# Patient Record
Sex: MALE | Race: WHITE | NOT HISPANIC OR LATINO | Employment: OTHER | URBAN - NONMETROPOLITAN AREA
[De-identification: names, ages, dates, MRNs, and addresses within clinical notes are randomized per-mention and may not be internally consistent; named-entity substitution may affect disease eponyms.]

---

## 2023-12-29 PROBLEM — I25.10 ARTERIOSCLEROSIS OF CORONARY ARTERY: Status: ACTIVE | Noted: 2023-12-29

## 2023-12-29 PROBLEM — C43.71: Status: ACTIVE | Noted: 2023-12-29

## 2023-12-29 PROBLEM — Z98.61 S/P PTCA (PERCUTANEOUS TRANSLUMINAL CORONARY ANGIOPLASTY): Status: ACTIVE | Noted: 2023-12-29

## 2023-12-29 PROBLEM — I21.3 STEMI (ST ELEVATION MYOCARDIAL INFARCTION) (MULTI): Status: ACTIVE | Noted: 2023-12-29

## 2023-12-29 PROBLEM — R04.0 BLEEDING FROM THE NOSE: Status: ACTIVE | Noted: 2023-12-29

## 2023-12-29 RX ORDER — MULTIVIT WITH MINERALS/HERBS
1 TABLET ORAL DAILY
COMMUNITY

## 2023-12-29 RX ORDER — PIOGLITAZONEHYDROCHLORIDE 30 MG/1
1 TABLET ORAL DAILY
COMMUNITY

## 2023-12-29 RX ORDER — NAPHAZOLINE HYDROCHLORIDE AND PHENIRAMINE MALEATE .25; 3 MG/ML; MG/ML
1 SOLUTION/ DROPS OPHTHALMIC 3 TIMES DAILY
COMMUNITY

## 2023-12-29 RX ORDER — ATORVASTATIN CALCIUM 80 MG/1
1 TABLET, FILM COATED ORAL NIGHTLY
COMMUNITY

## 2023-12-29 RX ORDER — CLOPIDOGREL BISULFATE 75 MG/1
1 TABLET ORAL DAILY
COMMUNITY
Start: 2022-11-29 | End: 2024-01-26

## 2023-12-29 RX ORDER — METFORMIN HYDROCHLORIDE 500 MG/1
500 TABLET ORAL
COMMUNITY

## 2023-12-29 RX ORDER — ASPIRIN 81 MG/1
1 TABLET ORAL DAILY
COMMUNITY

## 2023-12-29 RX ORDER — MULTIVIT-MIN/VIT C/HERB NO.124 250-8.875
TABLET,CHEWABLE ORAL
COMMUNITY
End: 2024-01-26

## 2023-12-29 RX ORDER — NITROGLYCERIN 0.4 MG/1
TABLET SUBLINGUAL
COMMUNITY
End: 2024-01-26 | Stop reason: SDUPTHER

## 2023-12-29 RX ORDER — SEMAGLUTIDE 1.34 MG/ML
1 INJECTION, SOLUTION SUBCUTANEOUS
COMMUNITY

## 2023-12-29 RX ORDER — DOXAZOSIN 8 MG/1
1 TABLET ORAL DAILY
COMMUNITY

## 2024-01-26 ENCOUNTER — OFFICE VISIT (OUTPATIENT)
Dept: CARDIOLOGY | Facility: CLINIC | Age: 79
End: 2024-01-26
Payer: MEDICARE

## 2024-01-26 VITALS
WEIGHT: 218 LBS | BODY MASS INDEX: 31.21 KG/M2 | HEIGHT: 70 IN | DIASTOLIC BLOOD PRESSURE: 70 MMHG | HEART RATE: 66 BPM | SYSTOLIC BLOOD PRESSURE: 128 MMHG

## 2024-01-26 DIAGNOSIS — R07.9 CHEST PAIN, UNSPECIFIED TYPE: ICD-10-CM

## 2024-01-26 DIAGNOSIS — E78.2 MIXED HYPERLIPIDEMIA: ICD-10-CM

## 2024-01-26 DIAGNOSIS — Z98.61 S/P PTCA (PERCUTANEOUS TRANSLUMINAL CORONARY ANGIOPLASTY): ICD-10-CM

## 2024-01-26 DIAGNOSIS — E11.9 DIABETES MELLITUS TYPE II, NON INSULIN DEPENDENT (MULTI): ICD-10-CM

## 2024-01-26 DIAGNOSIS — I25.10 ARTERIOSCLEROSIS OF CORONARY ARTERY: ICD-10-CM

## 2024-01-26 PROCEDURE — 1159F MED LIST DOCD IN RCRD: CPT | Performed by: INTERNAL MEDICINE

## 2024-01-26 PROCEDURE — 1160F RVW MEDS BY RX/DR IN RCRD: CPT | Performed by: INTERNAL MEDICINE

## 2024-01-26 PROCEDURE — 1126F AMNT PAIN NOTED NONE PRSNT: CPT | Performed by: INTERNAL MEDICINE

## 2024-01-26 PROCEDURE — 99213 OFFICE O/P EST LOW 20 MIN: CPT | Performed by: INTERNAL MEDICINE

## 2024-01-26 PROCEDURE — 1036F TOBACCO NON-USER: CPT | Performed by: INTERNAL MEDICINE

## 2024-01-26 RX ORDER — NITROGLYCERIN 0.4 MG/1
0.4 TABLET SUBLINGUAL EVERY 5 MIN PRN
Qty: 90 TABLET | Refills: 3 | Status: SHIPPED | OUTPATIENT
Start: 2024-01-26 | End: 2025-01-25

## 2024-01-26 NOTE — PROGRESS NOTES
"Subjective   Flash Cornejo is a 78 y.o. male       Chief Complaint    Follow-up          78-year-old gentleman returns for follow-up and is doing very well he has no cardiovascular complaints, events, hospitalizations, or nitrate usage.    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 He has underlying diabetes mellitus, low normal LV function, hyperlipidemia and obesity.     He continues exercising 3 times weekly, his NYHA classification is class I-II.    Recommendations: Obtain lipid panel, follow-up now in 1 year on same therapies and same activity level           Review of Systems   All other systems reviewed and are negative.         Visit Vitals  /70 (BP Location: Left arm, Patient Position: Sitting)   Pulse 66   Ht 1.778 m (5' 10\")   Wt 98.9 kg (218 lb)   BMI 31.28 kg/m²   Smoking Status Former   BSA 2.21 m²        Objective   Physical Exam  Constitutional:       Appearance: Normal appearance. He is normal weight.   HENT:      Nose: Nose normal.   Neck:      Vascular: No carotid bruit.   Cardiovascular:      Rate and Rhythm: Normal rate.      Pulses: Normal pulses.      Heart sounds: Normal heart sounds.   Pulmonary:      Effort: Pulmonary effort is normal.   Abdominal:      General: Bowel sounds are normal.      Palpations: Abdomen is soft.   Genitourinary:     Rectum: Normal.   Musculoskeletal:         General: Normal range of motion.      Cervical back: Normal range of motion.      Right lower leg: No edema.      Left lower leg: No edema.   Skin:     General: Skin is warm and dry.   Neurological:      General: No focal deficit present.      Mental Status: He is alert.   Psychiatric:         Mood and Affect: Mood normal.         Behavior: Behavior normal.         Thought Content: Thought content normal.         Judgment: Judgment normal.         Current Medications    Current Outpatient Medications:     aspirin 81 mg EC tablet, Take 1 tablet " (81 mg) by mouth once daily., Disp: , Rfl:     atorvastatin (Lipitor) 80 mg tablet, Take 1 tablet (80 mg) by mouth once daily at bedtime., Disp: , Rfl:     doxazosin (Cardura) 8 mg tablet, Take 1 tablet (8 mg) by mouth once daily., Disp: , Rfl:     empagliflozin (Jardiance) 25 mg, Take 1 tablet (25 mg) by mouth once daily., Disp: , Rfl:     metFORMIN (Glucophage) 500 mg tablet, Take 1 tablet (500 mg) by mouth 2 times a day with meals., Disp: , Rfl:     multivitamin with minerals (MULTIPLE VITAMIN-MINERALS ORAL), Take 1 tablet by mouth once daily., Disp: , Rfl:     naphazoline-pheniramine (Naphcon-A) 0.025-0.3 % ophthalmic solution, Administer 1 drop into both eyes 3 times a day., Disp: , Rfl:     nitroglycerin (Nitrostat) 0.4 mg SL tablet, Place under the tongue., Disp: , Rfl:     pioglitazone (Actos) 30 mg tablet, Take 1 tablet (30 mg) by mouth once daily., Disp: , Rfl:     semaglutide (Ozempic) 0.25 mg or 0.5 mg(2 mg/1.5 mL) pen injector, Inject 1 mg under the skin 1 (one) time per week., Disp: , Rfl:     vitamin B complex (Vitamins B Complex) tablet, Take 1 tablet by mouth once daily., Disp: , Rfl:                      Assessment/Plan   1. Arteriosclerosis of coronary artery        2. S/P PTCA (percutaneous transluminal coronary angioplasty)        3. Diabetes mellitus type II, non insulin dependent (CMS/[facility])        4. Mixed hyperlipidemia         Scribe Attestation  By signing my name below, IZoë LPN , Scribe   attest that this documentation has been prepared under the direction and in the presence of Arden Montano DO.

## 2025-01-28 ENCOUNTER — APPOINTMENT (OUTPATIENT)
Dept: CARDIOLOGY | Facility: CLINIC | Age: 80
End: 2025-01-28
Payer: MEDICARE

## 2025-02-25 ENCOUNTER — APPOINTMENT (OUTPATIENT)
Dept: CARDIOLOGY | Facility: CLINIC | Age: 80
End: 2025-02-25
Payer: MEDICARE

## 2025-02-25 VITALS
WEIGHT: 217 LBS | DIASTOLIC BLOOD PRESSURE: 78 MMHG | BODY MASS INDEX: 31.07 KG/M2 | HEIGHT: 70 IN | HEART RATE: 80 BPM | SYSTOLIC BLOOD PRESSURE: 120 MMHG

## 2025-02-25 DIAGNOSIS — Z87.891 FORMER SMOKER: ICD-10-CM

## 2025-02-25 DIAGNOSIS — I21.3 ST ELEVATION MYOCARDIAL INFARCTION (STEMI), UNSPECIFIED ARTERY (MULTI): ICD-10-CM

## 2025-02-25 DIAGNOSIS — E78.2 MIXED HYPERLIPIDEMIA: ICD-10-CM

## 2025-02-25 DIAGNOSIS — I25.10 ARTERIOSCLEROSIS OF CORONARY ARTERY: ICD-10-CM

## 2025-02-25 DIAGNOSIS — E11.9 DIABETES MELLITUS TYPE II, NON INSULIN DEPENDENT (MULTI): ICD-10-CM

## 2025-02-25 DIAGNOSIS — Z98.61 S/P PTCA (PERCUTANEOUS TRANSLUMINAL CORONARY ANGIOPLASTY): ICD-10-CM

## 2025-02-25 PROCEDURE — 1159F MED LIST DOCD IN RCRD: CPT | Performed by: INTERNAL MEDICINE

## 2025-02-25 PROCEDURE — 1036F TOBACCO NON-USER: CPT | Performed by: INTERNAL MEDICINE

## 2025-02-25 PROCEDURE — 1160F RVW MEDS BY RX/DR IN RCRD: CPT | Performed by: INTERNAL MEDICINE

## 2025-02-25 PROCEDURE — 99213 OFFICE O/P EST LOW 20 MIN: CPT | Performed by: INTERNAL MEDICINE

## 2025-02-25 RX ORDER — METFORMIN HYDROCHLORIDE 1000 MG/1
1000 TABLET ORAL
COMMUNITY

## 2025-02-25 RX ORDER — PIOGLITAZONEHYDROCHLORIDE 15 MG/1
15 TABLET ORAL DAILY
COMMUNITY

## 2025-02-25 NOTE — PROGRESS NOTES
"Subjective   Flash Cornejo is a 79 y.o. male       Chief Complaint    Annual Exam          79-year-old healthy gentleman returns for annual follow-up he is doing well he denies any cardiovascular events or complaints or nitrate usage or recurrent hospitalizations.  He is performing calisthenics 3 days a week and warmer months he is walking on a regular basis.    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 He has underlying diabetes mellitus, low normal LV function, hyperlipidemia and obesity.    Recommendations, continue current therapies, obtain lipid panel from primary care physician within this next upcoming year, continue active healthy lifestyle follow-up in 1 year         Review of Systems   All other systems reviewed and are negative.           Vitals:    02/25/25 1501   BP: 120/78   BP Location: Left arm   Patient Position: Sitting   Pulse: 80   Weight: 98.4 kg (217 lb)   Height: 1.778 m (5' 10\")        Objective   Physical Exam  Constitutional:       Appearance: Normal appearance.   HENT:      Nose: Nose normal.   Neck:      Vascular: No carotid bruit.   Cardiovascular:      Rate and Rhythm: Normal rate.      Pulses: Normal pulses.      Heart sounds: Normal heart sounds.   Pulmonary:      Effort: Pulmonary effort is normal.   Abdominal:      General: Bowel sounds are normal.      Palpations: Abdomen is soft.   Musculoskeletal:         General: Normal range of motion.      Cervical back: Normal range of motion.      Right lower leg: No edema.      Left lower leg: No edema.   Skin:     General: Skin is warm and dry.   Neurological:      General: No focal deficit present.      Mental Status: He is alert.   Psychiatric:         Mood and Affect: Mood normal.         Behavior: Behavior normal.         Thought Content: Thought " content normal.         Judgment: Judgment normal.         Allergies  Beta-blockers (beta-adrenergic blocking agts), Glyburide, and Liraglutide     Current Medications    Current Outpatient Medications:     aspirin 81 mg EC tablet, Take 1 tablet (81 mg) by mouth once daily., Disp: , Rfl:     atorvastatin (Lipitor) 80 mg tablet, Take 1 tablet (80 mg) by mouth once daily at bedtime., Disp: , Rfl:     doxazosin (Cardura) 8 mg tablet, Take 1 tablet (8 mg) by mouth once daily., Disp: , Rfl:     empagliflozin (Jardiance) 25 mg, Take 1 tablet (25 mg) by mouth once daily., Disp: , Rfl:     metFORMIN (Glucophage) 1,000 mg tablet, Take 1 tablet (1,000 mg) by mouth 2 times daily (morning and late afternoon)., Disp: , Rfl:     multivitamin with minerals (MULTIPLE VITAMIN-MINERALS ORAL), Take 1 tablet by mouth once daily., Disp: , Rfl:     naphazoline-pheniramine (Naphcon-A) 0.025-0.3 % ophthalmic solution, Administer 1 drop into both eyes 3 times a day., Disp: , Rfl:     nitroglycerin (Nitrostat) 0.4 mg SL tablet, Place 1 tablet (0.4 mg) under the tongue every 5 minutes if needed for chest pain., Disp: 90 tablet, Rfl: 3    pioglitazone (Actos) 15 mg tablet, Take 1 tablet (15 mg) by mouth once daily., Disp: , Rfl:     semaglutide (Ozempic) 0.25 mg or 0.5 mg(2 mg/1.5 mL) pen injector, Inject 1 mg under the skin 1 (one) time per week., Disp: , Rfl:     vitamin B complex (Vitamins B Complex) tablet, Take 1 tablet by mouth once daily., Disp: , Rfl:                      Assessment/Plan   1. Arteriosclerosis of coronary artery  Follow Up In Cardiology      2. S/P PTCA (percutaneous transluminal coronary angioplasty)  Follow Up In Cardiology      3. ST elevation myocardial infarction (STEMI), unspecified artery (Multi)        4. Diabetes mellitus type II, non insulin dependent (Multi)  Follow Up In Cardiology      5. Mixed hyperlipidemia  Follow Up In Cardiology      6. Former smoker        7. BMI 31.0-31.9,adult                 Scribe  Attestation  By signing my name below, I, Ottoniel Cruz LPNibmikael   attest that this documentation has been prepared under the direction and in the presence of Arden Montano DO.     Provider Attestation - Scribe documentation    All medical record entries made by the Scribe were at my direction and personally dictated by me. I have reviewed the chart and agree that the record accurately reflects my personal performance of the history, physical exam, discussion and plan.

## 2025-02-25 NOTE — PATIENT INSTRUCTIONS
Please bring all medicines, vitamins, and herbal supplements with you when you come to the office.    Prescriptions will not be filled unless you are compliant with your follow up appointments or have a follow up appointment scheduled as per instruction of your physician. Refills should be requested at the time of your visit.   BMI was above normal measurement. Current weight: 98.4 kg (217 lb)  Weight change since last visit (-) denotes wt loss -1 lbs   Weight loss needed to achieve BMI 25: 43.1 Lbs  Weight loss needed to achieve BMI 30: 8.4 Lbs  Provided instructions on dietary changes  Provided instructions on exercise.

## 2025-02-25 NOTE — LETTER
"February 25, 2025     Sandra Phipps DO  [address]  [place] [address]    Patient: Aaron Cornejo   YOB: 1945   Date of Visit: 2/25/2025       Dear Dr. Sandra Phipps, DO:    Thank you for referring Aaron Cornejo to me for evaluation. Below are my notes for this consultation.  If you have questions, please do not hesitate to call me. I look forward to following your patient along with you.       Sincerely,     Arden Montano DO      CC: No Recipients  ______________________________________________________________________________________    Subjective   Flash Cornejo is a 79 y.o. male       Chief Complaint    Annual Exam          79-year-old healthy gentleman returns for annual follow-up he is doing well he denies any cardiovascular events or complaints or nitrate usage or recurrent hospitalizations.  He is performing calisthenics 3 days a week and warmer months he is walking on a regular basis.    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 He has underlying diabetes mellitus, low normal LV function, hyperlipidemia and obesity.    Recommendations, continue current therapies, obtain lipid panel from primary care physician within this next upcoming year, continue active healthy lifestyle follow-up in 1 year         Review of Systems   All other systems reviewed and are negative.           Vitals:    02/25/25 1501   BP: 120/78   BP Location: Left arm   Patient Position: Sitting   Pulse: 80   Weight: 98.4 kg (217 lb)   Height: 1.778 m (5' 10\")        Objective   Physical Exam  Constitutional:       Appearance: Normal appearance.   HENT:      Nose: Nose normal.   Neck:      Vascular: No carotid bruit.   Cardiovascular:      Rate and Rhythm: Normal rate.      Pulses: Normal pulses. "      Heart sounds: Normal heart sounds.   Pulmonary:      Effort: Pulmonary effort is normal.   Abdominal:      General: Bowel sounds are normal.      Palpations: Abdomen is soft.   Musculoskeletal:         General: Normal range of motion.      Cervical back: Normal range of motion.      Right lower leg: No edema.      Left lower leg: No edema.   Skin:     General: Skin is warm and dry.   Neurological:      General: No focal deficit present.      Mental Status: He is alert.   Psychiatric:         Mood and Affect: Mood normal.         Behavior: Behavior normal.         Thought Content: Thought content normal.         Judgment: Judgment normal.         Allergies  Beta-blockers (beta-adrenergic blocking agts), Glyburide, and Liraglutide     Current Medications    Current Outpatient Medications:   •  aspirin 81 mg EC tablet, Take 1 tablet (81 mg) by mouth once daily., Disp: , Rfl:   •  atorvastatin (Lipitor) 80 mg tablet, Take 1 tablet (80 mg) by mouth once daily at bedtime., Disp: , Rfl:   •  doxazosin (Cardura) 8 mg tablet, Take 1 tablet (8 mg) by mouth once daily., Disp: , Rfl:   •  empagliflozin (Jardiance) 25 mg, Take 1 tablet (25 mg) by mouth once daily., Disp: , Rfl:   •  metFORMIN (Glucophage) 1,000 mg tablet, Take 1 tablet (1,000 mg) by mouth 2 times daily (morning and late afternoon)., Disp: , Rfl:   •  multivitamin with minerals (MULTIPLE VITAMIN-MINERALS ORAL), Take 1 tablet by mouth once daily., Disp: , Rfl:   •  naphazoline-pheniramine (Naphcon-A) 0.025-0.3 % ophthalmic solution, Administer 1 drop into both eyes 3 times a day., Disp: , Rfl:   •  nitroglycerin (Nitrostat) 0.4 mg SL tablet, Place 1 tablet (0.4 mg) under the tongue every 5 minutes if needed for chest pain., Disp: 90 tablet, Rfl: 3  •  pioglitazone (Actos) 15 mg tablet, Take 1 tablet (15 mg) by mouth once daily., Disp: , Rfl:   •  semaglutide (Ozempic) 0.25 mg or 0.5 mg(2 mg/1.5 mL) pen injector, Inject 1 mg under the skin 1 (one) time per  week., Disp: , Rfl:   •  vitamin B complex (Vitamins B Complex) tablet, Take 1 tablet by mouth once daily., Disp: , Rfl:                      Assessment/Plan   1. Arteriosclerosis of coronary artery  Follow Up In Cardiology      2. S/P PTCA (percutaneous transluminal coronary angioplasty)  Follow Up In Cardiology      3. ST elevation myocardial infarction (STEMI), unspecified artery (Multi)        4. Diabetes mellitus type II, non insulin dependent (Multi)  Follow Up In Cardiology      5. Mixed hyperlipidemia  Follow Up In Cardiology      6. Former smoker        7. BMI 31.0-31.9,adult                 Scribe Attestation  By signing my name below, I, Zoë FUENTES LPN  , Scribe   attest that this documentation has been prepared under the direction and in the presence of Arden Montano DO.     Provider Attestation - Scribe documentation    All medical record entries made by the Scribe were at my direction and personally dictated by me. I have reviewed the chart and agree that the record accurately reflects my personal performance of the history, physical exam, discussion and plan.

## 2026-02-24 ENCOUNTER — APPOINTMENT (OUTPATIENT)
Dept: CARDIOLOGY | Facility: CLINIC | Age: 81
End: 2026-02-24
Payer: MEDICARE